# Patient Record
Sex: FEMALE | Race: WHITE | NOT HISPANIC OR LATINO | Employment: OTHER | URBAN - METROPOLITAN AREA
[De-identification: names, ages, dates, MRNs, and addresses within clinical notes are randomized per-mention and may not be internally consistent; named-entity substitution may affect disease eponyms.]

---

## 2017-01-09 ENCOUNTER — PREPPED CHART (OUTPATIENT)
Dept: URBAN - METROPOLITAN AREA CLINIC 35 | Facility: CLINIC | Age: 52
End: 2017-01-09

## 2018-02-21 ENCOUNTER — ESTABLISHED PATIENT (OUTPATIENT)
Dept: URBAN - METROPOLITAN AREA CLINIC 35 | Facility: CLINIC | Age: 53
End: 2018-02-21

## 2018-02-21 DIAGNOSIS — H52.201: ICD-10-CM

## 2018-02-21 DIAGNOSIS — H52.02: ICD-10-CM

## 2018-02-21 DIAGNOSIS — H52.4: ICD-10-CM

## 2018-02-21 PROCEDURE — 92015 DETERMINE REFRACTIVE STATE: CPT

## 2018-02-21 PROCEDURE — 92014 COMPRE OPH EXAM EST PT 1/>: CPT

## 2018-02-21 ASSESSMENT — KERATOMETRY
OS_K2POWER_DIOPTERS: 45.25
OS_K1POWER_DIOPTERS: 45.50
OD_K2POWER_DIOPTERS: 45.00
OD_K1POWER_DIOPTERS: 44.75

## 2018-02-21 ASSESSMENT — VISUAL ACUITY
OS_CC: J1
OD_SC: 20/20
OD_CC: J1+
OS_SC: 20/25

## 2018-02-21 ASSESSMENT — TONOMETRY
OD_IOP_MMHG: 14
OS_IOP_MMHG: 14

## 2020-05-14 NOTE — PATIENT DISCUSSION
DRY EYES : Discussed with patient the importance of keeping the eye moist and the symptoms associated with dry eyes including blurry vision, tearing, burning, and burton sensation. Advised patient to minimize use of any fans blowing directly on the face. Advised patient to continue with artificial tears 2-3 times daily.

## 2021-05-04 ENCOUNTER — ESTABLISHED COMPREHENSIVE EXAM (OUTPATIENT)
Dept: URBAN - METROPOLITAN AREA CLINIC 35 | Facility: CLINIC | Age: 56
End: 2021-05-04

## 2021-05-04 DIAGNOSIS — H52.03: ICD-10-CM

## 2021-05-04 PROCEDURE — 92014 COMPRE OPH EXAM EST PT 1/>: CPT

## 2021-05-04 PROCEDURE — 92015 DETERMINE REFRACTIVE STATE: CPT

## 2021-05-04 ASSESSMENT — KERATOMETRY
OD_K2POWER_DIOPTERS: 45.50
OS_K1POWER_DIOPTERS: 44.75
OD_K1POWER_DIOPTERS: 44.50
OS_K2POWER_DIOPTERS: 45.75

## 2021-05-04 ASSESSMENT — VISUAL ACUITY
OS_CC: J1
OD_CC: 20/20
OS_CC: 20/25
OD_CC: J1

## 2021-05-04 ASSESSMENT — TONOMETRY
OS_IOP_MMHG: 14
OD_IOP_MMHG: 14

## 2022-08-02 ENCOUNTER — COMPREHENSIVE EXAM (OUTPATIENT)
Dept: URBAN - METROPOLITAN AREA CLINIC 35 | Facility: CLINIC | Age: 57
End: 2022-08-02

## 2022-08-02 DIAGNOSIS — H52.03: ICD-10-CM

## 2022-08-02 PROCEDURE — 92015 DETERMINE REFRACTIVE STATE: CPT

## 2022-08-02 PROCEDURE — 92014 COMPRE OPH EXAM EST PT 1/>: CPT

## 2022-08-02 ASSESSMENT — VISUAL ACUITY
OS_CC: 20/20 BLURRY
OD_CC: 20/20
OS_CC: J6
OU_CC: J1
OD_CC: J3

## 2022-08-02 ASSESSMENT — KERATOMETRY
OS_AXISANGLE_DEGREES: 005
OD_K2POWER_DIOPTERS: 45.25
OS_K2POWER_DIOPTERS: 45.50
OD_AXISANGLE2_DEGREES: 80
OD_K1POWER_DIOPTERS: 44.50
OS_K1POWER_DIOPTERS: 44.75
OD_AXISANGLE_DEGREES: 170
OS_AXISANGLE2_DEGREES: 95

## 2022-08-02 ASSESSMENT — TONOMETRY
OD_IOP_MMHG: 17
OS_IOP_MMHG: 17

## 2023-08-08 ENCOUNTER — COMPREHENSIVE EXAM (OUTPATIENT)
Dept: URBAN - METROPOLITAN AREA CLINIC 35 | Facility: CLINIC | Age: 58
End: 2023-08-08

## 2023-08-08 DIAGNOSIS — H52.03: ICD-10-CM

## 2023-08-08 DIAGNOSIS — H52.202: ICD-10-CM

## 2023-08-08 PROCEDURE — 92014 COMPRE OPH EXAM EST PT 1/>: CPT

## 2023-08-08 PROCEDURE — 92015 DETERMINE REFRACTIVE STATE: CPT

## 2023-08-08 ASSESSMENT — KERATOMETRY
OD_K1POWER_DIOPTERS: 44.50
OS_AXISANGLE2_DEGREES: 95
OS_K1POWER_DIOPTERS: 44.75
OS_K2POWER_DIOPTERS: 45.50
OS_AXISANGLE_DEGREES: 005
OD_K2POWER_DIOPTERS: 45.25
OD_AXISANGLE_DEGREES: 170
OD_AXISANGLE2_DEGREES: 80

## 2023-08-08 ASSESSMENT — TONOMETRY
OD_IOP_MMHG: 12
OS_IOP_MMHG: 14

## 2023-08-14 ASSESSMENT — VISUAL ACUITY
OS_CC: 20/20-1
OS_CC: J1
OD_CC: 20/20
OD_CC: J1

## 2023-08-16 ENCOUNTER — ESTABLISHED PATIENT (OUTPATIENT)
Dept: URBAN - METROPOLITAN AREA CLINIC 35 | Facility: CLINIC | Age: 58
End: 2023-08-16

## 2023-08-16 VITALS — HEIGHT: 64 IN | WEIGHT: 132 LBS | BODY MASS INDEX: 22.53 KG/M2

## 2023-08-16 DIAGNOSIS — L98.8: ICD-10-CM

## 2023-08-16 DIAGNOSIS — H02.834: ICD-10-CM

## 2023-08-16 DIAGNOSIS — H04.123: ICD-10-CM

## 2023-08-16 DIAGNOSIS — H02.832: ICD-10-CM

## 2023-08-16 DIAGNOSIS — H02.835: ICD-10-CM

## 2023-08-16 DIAGNOSIS — H02.831: ICD-10-CM

## 2023-08-16 PROCEDURE — 99214 OFFICE O/P EST MOD 30 MIN: CPT

## 2023-08-16 PROCEDURE — 92285 EXTERNAL OCULAR PHOTOGRAPHY: CPT

## 2023-08-16 ASSESSMENT — VISUAL ACUITY
OD_CC: 20/20
OS_CC: 20/20-1

## 2023-08-16 ASSESSMENT — KERATOMETRY
OD_AXISANGLE_DEGREES: 170
OS_AXISANGLE_DEGREES: 005
OS_K2POWER_DIOPTERS: 45.50
OD_AXISANGLE2_DEGREES: 80
OS_K1POWER_DIOPTERS: 44.75
OS_AXISANGLE2_DEGREES: 95
OD_K1POWER_DIOPTERS: 44.50
OD_K2POWER_DIOPTERS: 45.25

## 2024-08-19 ENCOUNTER — COMPREHENSIVE EXAM (OUTPATIENT)
Dept: URBAN - METROPOLITAN AREA CLINIC 35 | Facility: CLINIC | Age: 59
End: 2024-08-19

## 2024-08-19 DIAGNOSIS — H52.03: ICD-10-CM

## 2024-08-19 DIAGNOSIS — H04.123: ICD-10-CM

## 2024-08-19 PROCEDURE — 92014 COMPRE OPH EXAM EST PT 1/>: CPT

## 2024-08-19 PROCEDURE — 92015 DETERMINE REFRACTIVE STATE: CPT

## 2024-08-19 ASSESSMENT — VISUAL ACUITY
OS_CC: 20/30-1
OU_CC: 20/20
OD_CC: 20/25
OS_CC: J6
OU_CC: J1
OD_CC: J6

## 2024-08-19 ASSESSMENT — KERATOMETRY
OS_AXISANGLE_DEGREES: 005
OD_K1POWER_DIOPTERS: 44.50
OD_AXISANGLE2_DEGREES: 80
OD_K2POWER_DIOPTERS: 45.25
OS_K1POWER_DIOPTERS: 44.75
OS_K2POWER_DIOPTERS: 45.50
OS_AXISANGLE2_DEGREES: 95
OD_AXISANGLE_DEGREES: 170

## 2024-08-19 ASSESSMENT — TONOMETRY
OD_IOP_MMHG: 18
OS_IOP_MMHG: 17

## 2025-04-29 ENCOUNTER — EMERGENCY VISIT (OUTPATIENT)
Age: 60
End: 2025-04-29

## 2025-04-29 DIAGNOSIS — H25.13: ICD-10-CM

## 2025-04-29 DIAGNOSIS — H04.123: ICD-10-CM

## 2025-04-29 PROCEDURE — 99213 OFFICE O/P EST LOW 20 MIN: CPT

## 2025-08-25 ENCOUNTER — COMPREHENSIVE EXAM (OUTPATIENT)
Age: 60
End: 2025-08-25

## 2025-08-25 DIAGNOSIS — H52.03: ICD-10-CM

## 2025-08-25 DIAGNOSIS — H52.202: ICD-10-CM

## 2025-08-25 PROCEDURE — 92014 COMPRE OPH EXAM EST PT 1/>: CPT

## 2025-08-25 PROCEDURE — 92015 DETERMINE REFRACTIVE STATE: CPT
